# Patient Record
(demographics unavailable — no encounter records)

---

## 2024-10-16 NOTE — HISTORY OF PRESENT ILLNESS
[FreeTextEntry1] : 10/16/24 -- Pt is a 85 yo F,  presenting for follow up urologic evaluation of recurrent UTIs. Referred by Dr. Goldberg. She has had 3 positive UC in the past 3 months - E Coli and Enterococcus. Pt reports she is not consistent with vaginal Estrace cream, though she still takes D mannose. Pt reports straining to void. No hematuria. No kidney stones. No other urinary symptoms or complaints.   Udip trace - KETONES.     23 -- Ms. BLOSSOM PRASAD comes in today for her urologic evaluation. Pt is a 82 yo F who was referred by Dr. Goldberg for recurrent UTIs.  From her general urologic history, she describes lifelong hx of UTIs--2 recent infections this year. She presents today following a recent infection this past  for which she was treated with a course ?Abx and finished 5 days ago--(no culture available to my review). States infection occurred in the setting of colitis.  Moreover, she describes sensation of incomplete bladder emptying and mild urinary leakage exacerbated during exercise/workout.  She is --not sexually active and denies sensation of bladder prolapse.  She is taking D mannose and applying transvaginal estrogen to help prevent UTIs (Dr. Bautista )   Sono (performed to assess bladder emptying): 65cc Udip: 2023 ---trace leuks   PMH: RA, Autoimmune, Colitis, Arthritis PSH: Tonsillectomy, ?Orthopedic FH: denies kidney stones SocHx: previous smoker (1ppd, teenager), no alcohol   Allergies: Sulfur of note, she has done well with keflex in the past with no side effects.

## 2024-10-16 NOTE — ADDENDUM
[FreeTextEntry1] : This note was written by Melina Roth on 10/16/2024, acting solely as a scribe for Dr. Ele Acevedo MD. I have documented the information dictated during the patient encounter for the following sections: RFV, HPI, ROS, PE, ASSESSMENT/PLAN.   I personally performed the services described in the documentation, reviewed the documentation recorded by the scribe in my presence, and it accurately and completely records my words and actions.

## 2024-10-16 NOTE — PHYSICAL EXAM
[General Appearance - Well Developed] : well developed [General Appearance - Well Nourished] : well nourished [Normal Appearance] : normal appearance [Well Groomed] : well groomed [General Appearance - In No Acute Distress] : no acute distress [Heart Rate And Rhythm] : heart rate and rhythm were normal [Normal Station and Gait] : the gait and station were normal for the patient's age [Skin Color & Pigmentation] : normal skin color and pigmentation [Skin Turgor] : supple [] : no rash [Skin Lesions] : no skin lesions [No Focal Deficits] : no focal deficits [Oriented To Time, Place, And Person] : oriented to person, place, and time [Affect] : the affect was normal [Mood] : the mood was normal [Not Anxious] : not anxious

## 2024-10-16 NOTE — ASSESSMENT
[FreeTextEntry1] : I discussed the findings and options with Ms. PRASAD in detail.   Ms. PRASAD reports 3 UTIs in the past 4 months. Reports she is inconsistent with Estrace.  - Urine was sent for culture to r/o infection.   - Continue D Mannose.  - Refill Estrace and stated importance of consistency with treatment. Instruction: Nightly for 2x weeks, followed by 3x week   Return in 2-3 months for re-assessment in setting of additional UTIs, otherwise f/u in 6 months. Patient expressed understanding.    41 min spent on this encounter.

## 2025-01-22 NOTE — HISTORY OF PRESENT ILLNESS
[FreeTextEntry1] : 01/15/2025 -- 84 F with hx recurrent UTIs. She has not had any recent UTIs. Denies gross hematuria or dysuria. She presents clinically well. Patient reports improvement with transvaginal estrogen cream and d mannose.     10/16/24 -- Pt is a 83 yo F,  presenting for follow up urologic evaluation of recurrent UTIs. Referred by Dr. Goldberg. She has had 3 positive UC in the past 3 months - E Coli and Enterococcus. Pt reports she is not consistent with vaginal Estrace cream, though she still takes D mannose. Pt reports straining to void. No hematuria. No kidney stones. No other urinary symptoms or complaints.   Udip trace - KETONES.     23 -- Ms. BLOSSOM PRASAD comes in today for her urologic evaluation. Pt is a 84 yo F who was referred by Dr. Goldberg for recurrent UTIs.  From her general urologic history, she describes lifelong hx of UTIs--2 recent infections this year. She presents today following a recent infection this past  for which she was treated with a course ?Abx and finished 5 days ago--(no culture available to my review). States infection occurred in the setting of colitis.  Moreover, she describes sensation of incomplete bladder emptying and mild urinary leakage exacerbated during exercise/workout.  She is --not sexually active and denies sensation of bladder prolapse.  She is taking D mannose and applying transvaginal estrogen to help prevent UTIs (Dr. Bautista )   Sono (performed to assess bladder emptying): 65cc Udip: 2023 ---trace leuks   PMH: RA, Autoimmune, Colitis, Arthritis PSH: Tonsillectomy, ?Orthopedic FH: denies kidney stones SocHx: previous smoker (1ppd, teenager), no alcohol   Allergies: Sulfur of note, she has done well with keflex in the past with no side effects.

## 2025-01-22 NOTE — ASSESSMENT
[FreeTextEntry1] : I discussed the findings and options with Ms. BLOSSOM PRASAD in detail.   Ms. PRASAD reports to be well managed with no new urinary complaints. She has not had any recent UTIs. Denies gross hematuria or dysuria.   - Continue current regimen, d mannose and estrogen cream. We discussed the importance of consistency with treatment- UTI prevention.   Pt will plan to return in 1 year for annual f/u, or sooner if needed. Patient expressed understanding.    The total amount of time I have personally spent preparing for this visit, reviewing the patient's test results, obtaining external history, ordering tests/medications, documenting clinical information, communicating with and counseling the patient/family and/or caregiver(s), reviewing old records, and spent face to face with the patient explaining the above was 35 minutes.

## 2025-01-22 NOTE — ADDENDUM
[FreeTextEntry1] : A portion of this note was written by [Clark Cornelius] on 01/15/2025 acting as a scribe for Dr. Acevedo.   I have personally reviewed the chart and agree that the record accurately reflects my personal performance of the history, physical exam, assessment, and plan.

## 2025-01-22 NOTE — HISTORY OF PRESENT ILLNESS
[FreeTextEntry1] : 01/15/2025 -- 84 F with hx recurrent UTIs. She has not had any recent UTIs. Denies gross hematuria or dysuria. She presents clinically well. Patient reports improvement with transvaginal estrogen cream and d mannose.     10/16/24 -- Pt is a 85 yo F,  presenting for follow up urologic evaluation of recurrent UTIs. Referred by Dr. Goldberg. She has had 3 positive UC in the past 3 months - E Coli and Enterococcus. Pt reports she is not consistent with vaginal Estrace cream, though she still takes D mannose. Pt reports straining to void. No hematuria. No kidney stones. No other urinary symptoms or complaints.   Udip trace - KETONES.     23 -- Ms. BLOSSOM PRASAD comes in today for her urologic evaluation. Pt is a 84 yo F who was referred by Dr. Goldberg for recurrent UTIs.  From her general urologic history, she describes lifelong hx of UTIs--2 recent infections this year. She presents today following a recent infection this past  for which she was treated with a course ?Abx and finished 5 days ago--(no culture available to my review). States infection occurred in the setting of colitis.  Moreover, she describes sensation of incomplete bladder emptying and mild urinary leakage exacerbated during exercise/workout.  She is --not sexually active and denies sensation of bladder prolapse.  She is taking D mannose and applying transvaginal estrogen to help prevent UTIs (Dr. Bautista )   Sono (performed to assess bladder emptying): 65cc Udip: 2023 ---trace leuks   PMH: RA, Autoimmune, Colitis, Arthritis PSH: Tonsillectomy, ?Orthopedic FH: denies kidney stones SocHx: previous smoker (1ppd, teenager), no alcohol   Allergies: Sulfur of note, she has done well with keflex in the past with no side effects.